# Patient Record
Sex: MALE | Race: OTHER | HISPANIC OR LATINO
[De-identification: names, ages, dates, MRNs, and addresses within clinical notes are randomized per-mention and may not be internally consistent; named-entity substitution may affect disease eponyms.]

---

## 2018-01-12 ENCOUNTER — APPOINTMENT (OUTPATIENT)
Dept: VASCULAR SURGERY | Facility: CLINIC | Age: 58
End: 2018-01-12

## 2018-03-28 ENCOUNTER — APPOINTMENT (OUTPATIENT)
Dept: VASCULAR SURGERY | Facility: CLINIC | Age: 58
End: 2018-03-28
Payer: MEDICARE

## 2018-03-28 PROCEDURE — 93926 LOWER EXTREMITY STUDY: CPT | Mod: 59

## 2018-03-28 PROCEDURE — 93990 DOPPLER FLOW TESTING: CPT

## 2018-03-28 PROCEDURE — 99024 POSTOP FOLLOW-UP VISIT: CPT

## 2018-11-07 ENCOUNTER — APPOINTMENT (OUTPATIENT)
Dept: VASCULAR SURGERY | Facility: CLINIC | Age: 58
End: 2018-11-07
Payer: MEDICARE

## 2018-11-07 PROCEDURE — 99213 OFFICE O/P EST LOW 20 MIN: CPT

## 2018-11-07 PROCEDURE — 93931 UPPER EXTREMITY STUDY: CPT

## 2018-11-07 PROCEDURE — 93971 EXTREMITY STUDY: CPT

## 2018-11-14 ENCOUNTER — APPOINTMENT (OUTPATIENT)
Dept: CARDIOLOGY | Facility: CLINIC | Age: 58
End: 2018-11-14

## 2018-11-14 VITALS
WEIGHT: 156 LBS | BODY MASS INDEX: 23.64 KG/M2 | DIASTOLIC BLOOD PRESSURE: 74 MMHG | RESPIRATION RATE: 12 BRPM | OXYGEN SATURATION: 100 % | HEART RATE: 72 BPM | SYSTOLIC BLOOD PRESSURE: 142 MMHG | HEIGHT: 68 IN | TEMPERATURE: 98.5 F

## 2018-11-14 DIAGNOSIS — M79.605 PAIN IN LEFT LEG: ICD-10-CM

## 2018-11-14 DIAGNOSIS — Z98.890 OTHER SPECIFIED POSTPROCEDURAL STATES: ICD-10-CM

## 2018-11-14 DIAGNOSIS — Z86.39 PERSONAL HISTORY OF OTHER ENDOCRINE, NUTRITIONAL AND METABOLIC DISEASE: ICD-10-CM

## 2018-11-14 DIAGNOSIS — I51.3 INTRACARDIAC THROMBOSIS, NOT ELSEWHERE CLASSIFIED: ICD-10-CM

## 2018-11-14 DIAGNOSIS — I99.8 OTHER DISORDER OF CIRCULATORY SYSTEM: ICD-10-CM

## 2018-11-14 NOTE — ASSESSMENT
[FreeTextEntry1] : 59 yo male with CAD, prior inferior wall STEMI s/p TNK 2012, and anterior wall STEMI s/p PCI w/ Resolute stent (IRAJ) to LAD in 5/2014 at [facility], chronic systolic heart failure, HTN, DM, hyperlipidemia, PAD, nd CKD, who is currently pending right brachiocephalic AV fistula surgery on 11/20/18 with Dr. Mayank Thornton at [place]. Patient is clinically stable from cardiac standpoint and appears to be euvolemic at this time.\par \par Patient does not have any cardiac contraindications to his pending right AV fistula surgery and may proceed with acceptable cardiac risk and without further cardiac work-up. Patient is medically optimized from cardiac standpoint.\par Patient was instructed to hold his coumadin 5 days prior to his surgery date as per vascular surgery.\par Patient to resume coumadin post-operatively when safe from bleeding risk standpoint as per surgery.\par \par Will continue current medical management of patient's chronic systolic HF and CAD.\par \par Patient's blood pressure is well controlled currently. Will continue to monitor on current medical management.

## 2018-11-14 NOTE — HISTORY OF PRESENT ILLNESS
[Preoperative Visit] : for a medical evaluation prior to surgery [Scheduled Procedure ___] : a [unfilled] [Date of Surgery ___] : on [unfilled] [Surgeon Name ___] : surgeon: [unfilled] [Stable] : Stable [Cardiovascular Disease] : cardiovascular disease [Renal Disease] : renal disease [Thromboembolic Problems] : thromboembolic problems [Fever] : no fever [Fatigue] : no fatigue [Chest Pain] : no chest pain [Dyspnea] : no dyspnea [Anti-Platelet Agents] : anti-platelet agents [Frequent Aspirin Use] : frequent aspirin use [Prior Anesthesia] : Prior anesthesia [Prev Anesthesia Reaction] : no previous anesthesia reaction [FreeTextEntry1] : \par 59 yo male with CAD, prior inferior wall STEMI s/p TNK 2012, and anterior wall STEMI s/p PCI w/ Resolute stent (IRAJ) to LAD in 5/2014 at [facility], chronic systolic heart failure, HTN, DM, hyperlipidemia, and CKD, who is currently pending right brachiocephalic AV fistula surgery on 11/20/18 with Dr. Mayank Thornton at [place]. Patient was recently hospitalized from 10/29 - 10/31 for acute on chronic systolic heart failure. Patient was diuresed during hospitalization. Patient is currently doing well from cardiac standpoint. Patient denies exertional chest pain and dyspnea. Patient denies palpitations, syncope, edema, melena, hematochezia, or hematemesis.\par \par PMD: Deepika Cabrera MD\par Nephrology: Hamlet Wagner MD

## 2018-11-14 NOTE — DISCUSSION/SUMMARY
[Optimized for Surgery] : the patient is optimized for surgery [Continue] : Continue medications as currently directed

## 2018-11-14 NOTE — PHYSICAL EXAM
[General Appearance - Well Developed] : well developed [General Appearance - Well Nourished] : well nourished [General Appearance - In No Acute Distress] : no acute distress [Normal Conjunctiva] : the conjunctiva exhibited no abnormalities [No Oral Cyanosis] : no oral cyanosis [] : no respiratory distress [Respiration, Rhythm And Depth] : normal respiratory rhythm and effort [Auscultation Breath Sounds / Voice Sounds] : lungs were clear to auscultation bilaterally [Normal Rate] : normal [Rhythm Regular] : regular [Normal S1] : normal S1 [Normal S2] : normal S2 [No Murmur] : no murmurs heard [No Pitting Edema] : no pitting edema present [Bowel Sounds] : normal bowel sounds [Abdomen Soft] : soft [Abdomen Tenderness] : non-tender [Abnormal Walk] : normal gait [Nail Clubbing] : no clubbing of the fingernails [Cyanosis, Localized] : no localized cyanosis [Oriented To Time, Place, And Person] : oriented to person, place, and time [Affect] : the affect was normal [Mood] : the mood was normal [FreeTextEntry1] : No JVD present [S3] : no S3 [S4] : no S4 [Right Carotid Bruit] : no bruit heard over the right carotid [Left Carotid Bruit] : no bruit heard over the left carotid

## 2018-11-20 ENCOUNTER — APPOINTMENT (OUTPATIENT)
Dept: VASCULAR SURGERY | Facility: HOSPITAL | Age: 58
End: 2018-11-20

## 2018-12-03 ENCOUNTER — RX RENEWAL (OUTPATIENT)
Age: 58
End: 2018-12-03

## 2018-12-14 ENCOUNTER — RECORD ABSTRACTING (OUTPATIENT)
Age: 58
End: 2018-12-14

## 2018-12-14 DIAGNOSIS — Z86.2 PERSONAL HISTORY OF DISEASES OF THE BLOOD AND BLOOD-FORMING ORGANS AND CERTAIN DISORDERS INVOLVING THE IMMUNE MECHANISM: ICD-10-CM

## 2018-12-14 DIAGNOSIS — Z87.2 PERSONAL HISTORY OF DISEASES OF THE SKIN AND SUBCUTANEOUS TISSUE: ICD-10-CM

## 2018-12-14 DIAGNOSIS — Z83.3 FAMILY HISTORY OF DIABETES MELLITUS: ICD-10-CM

## 2018-12-14 DIAGNOSIS — Z82.3 FAMILY HISTORY OF STROKE: ICD-10-CM

## 2018-12-14 DIAGNOSIS — Z86.69 PERSONAL HISTORY OF OTHER DISEASES OF THE NERVOUS SYSTEM AND SENSE ORGANS: ICD-10-CM

## 2018-12-19 ENCOUNTER — APPOINTMENT (OUTPATIENT)
Dept: VASCULAR SURGERY | Facility: CLINIC | Age: 58
End: 2018-12-19

## 2018-12-21 ENCOUNTER — APPOINTMENT (OUTPATIENT)
Dept: INTERNAL MEDICINE | Facility: CLINIC | Age: 58
End: 2018-12-21

## 2019-01-02 ENCOUNTER — APPOINTMENT (OUTPATIENT)
Dept: VASCULAR SURGERY | Facility: CLINIC | Age: 59
End: 2019-01-02
Payer: MEDICARE

## 2019-01-02 VITALS — DIASTOLIC BLOOD PRESSURE: 95 MMHG | SYSTOLIC BLOOD PRESSURE: 159 MMHG | HEART RATE: 85 BPM

## 2019-01-02 PROCEDURE — 99024 POSTOP FOLLOW-UP VISIT: CPT

## 2019-01-03 ENCOUNTER — APPOINTMENT (OUTPATIENT)
Dept: NEPHROLOGY | Facility: CLINIC | Age: 59
End: 2019-01-03

## 2019-01-03 ENCOUNTER — APPOINTMENT (OUTPATIENT)
Dept: INTERNAL MEDICINE | Facility: CLINIC | Age: 59
End: 2019-01-03
Payer: MEDICARE

## 2019-01-03 VITALS
HEIGHT: 68 IN | WEIGHT: 156 LBS | SYSTOLIC BLOOD PRESSURE: 152 MMHG | TEMPERATURE: 97.1 F | DIASTOLIC BLOOD PRESSURE: 80 MMHG | BODY MASS INDEX: 23.64 KG/M2 | HEART RATE: 76 BPM

## 2019-01-03 DIAGNOSIS — Z86.79 PERSONAL HISTORY OF OTHER DISEASES OF THE CIRCULATORY SYSTEM: ICD-10-CM

## 2019-01-03 LAB — INR PPP: 1.2 RATIO

## 2019-01-03 PROCEDURE — 85610 PROTHROMBIN TIME: CPT | Mod: QW

## 2019-01-03 PROCEDURE — 99214 OFFICE O/P EST MOD 30 MIN: CPT | Mod: 25

## 2019-01-03 RX ORDER — LISINOPRIL 20 MG/1
20 TABLET ORAL
Refills: 0 | Status: DISCONTINUED | COMMUNITY
End: 2019-01-03

## 2019-01-03 RX ORDER — HYDROMORPHONE HYDROCHLORIDE 2 MG/1
2 TABLET ORAL
Refills: 0 | Status: DISCONTINUED | COMMUNITY
End: 2019-01-03

## 2019-01-03 RX ORDER — WARFARIN SODIUM 6 MG/1
TABLET ORAL
Refills: 0 | Status: DISCONTINUED | COMMUNITY
End: 2019-01-03

## 2019-01-03 NOTE — HISTORY OF PRESENT ILLNESS
[Post-hospitalization from ___ Hospital] : Post-hospitalization from [unfilled] Hospital [Admitted on: ___] : The patient was admitted on [unfilled] [Discharged on ___] : discharged on [unfilled] [Discharge Summary] : discharge summary [Discharge Med List] : discharge medication list [FreeTextEntry2] : Mr. Lopez  comes in today  after being discharged from [facility] on December 20. He was admitted for dyspnea getting worse and was diagnosed with heart failure. He has end-stage renal disease and had a fistula created in November.He has been evaluated by nephrology  and cardiology while in the hospital. \par today he feels better

## 2019-01-03 NOTE — PHYSICAL EXAM
[No Acute Distress] : no acute distress [Normal Sclera/Conjunctiva] : normal sclera/conjunctiva [Normal Outer Ear/Nose] : the outer ears and nose were normal in appearance [Normal Oropharynx] : the oropharynx was normal [No JVD] : no jugular venous distention [Supple] : supple [No Respiratory Distress] : no respiratory distress  [Clear to Auscultation] : lungs were clear to auscultation bilaterally [Regular Rhythm] : with a regular rhythm [Normal S1, S2] : normal S1 and S2 [Pedal Pulses Present] : the pedal pulses are present [No Edema] : there was no peripheral edema [Soft] : abdomen soft [Non Tender] : non-tender [No Rash] : no rash [Normal Gait] : normal gait [No Focal Deficits] : no focal deficits [de-identified] : faint pedal pulses, R antecubital AVF c  bruit [de-identified] : L transmetatarsal amputation

## 2019-01-03 NOTE — HEALTH RISK ASSESSMENT
[No falls in past year] : Patient reported no falls in the past year [0] : 2) Feeling down, depressed, or hopeless: Not at all (0) [] : No [[id number]] : 0

## 2019-01-03 NOTE — REVIEW OF SYSTEMS
[Negative] : Neurological [Dyspnea on Exertion] : not dyspnea on exertion [FreeTextEntry6] : resolved [FreeTextEntry8] : still urinating [FreeTextEntry9] : L [de-identified] : R antecubital AV F

## 2019-02-04 ENCOUNTER — APPOINTMENT (OUTPATIENT)
Dept: INTERNAL MEDICINE | Facility: CLINIC | Age: 59
End: 2019-02-04
Payer: MEDICARE

## 2019-02-04 VITALS
HEART RATE: 76 BPM | SYSTOLIC BLOOD PRESSURE: 152 MMHG | HEIGHT: 68 IN | WEIGHT: 163 LBS | DIASTOLIC BLOOD PRESSURE: 80 MMHG | BODY MASS INDEX: 24.71 KG/M2 | TEMPERATURE: 97.3 F | OXYGEN SATURATION: 95 %

## 2019-02-04 VITALS — SYSTOLIC BLOOD PRESSURE: 146 MMHG | DIASTOLIC BLOOD PRESSURE: 80 MMHG

## 2019-02-04 PROCEDURE — 85610 PROTHROMBIN TIME: CPT | Mod: QW

## 2019-02-04 PROCEDURE — 99214 OFFICE O/P EST MOD 30 MIN: CPT | Mod: 25

## 2019-02-04 NOTE — PHYSICAL EXAM
[No Acute Distress] : no acute distress [Normal Sclera/Conjunctiva] : normal sclera/conjunctiva [Normal Outer Ear/Nose] : the outer ears and nose were normal in appearance [Normal Oropharynx] : the oropharynx was normal [No JVD] : no jugular venous distention [Supple] : supple [No Respiratory Distress] : no respiratory distress  [Clear to Auscultation] : lungs were clear to auscultation bilaterally [Regular Rhythm] : with a regular rhythm [Normal S1, S2] : normal S1 and S2 [Pedal Pulses Present] : the pedal pulses are present [No Edema] : there was no peripheral edema [Soft] : abdomen soft [Non Tender] : non-tender [Normal Gait] : normal gait [No Focal Deficits] : no focal deficits [de-identified] : faint pedal pulses, R antecubital AVF c  bruit [de-identified] : L transmetatarsal amputation

## 2019-02-04 NOTE — HEALTH RISK ASSESSMENT
[] : No [No falls in past year] : Patient reported no falls in the past year [0] : 2) Feeling down, depressed, or hopeless: Not at all (0) [de-identified] : Sharmaine Thornton Butman [[id number]] : 0

## 2019-02-04 NOTE — HISTORY OF PRESENT ILLNESS
[de-identified] : here for medical f/u , doing well, except 2 episodes of nose bleeds last week, stopped coumadin 1/29 and 1/30 and restarted after that  \par denies SOB and is compliant to insulin, still urinating and not on HD

## 2019-02-04 NOTE — REVIEW OF SYSTEMS
[Dyspnea on Exertion] : not dyspnea on exertion [Negative] : Neurological [FreeTextEntry6] : resolved [FreeTextEntry8] : still urinating [de-identified] : R antecubital AV F

## 2019-02-05 LAB — INR PPP: 1.4 RATIO

## 2019-02-07 LAB
ALBUMIN SERPL ELPH-MCNC: 3 G/DL
ALP BLD-CCNC: 208 U/L
ALT SERPL-CCNC: 33 U/L
ANION GAP SERPL CALC-SCNC: 10 MMOL/L
AST SERPL-CCNC: 38 U/L
BASOPHILS # BLD AUTO: 0.05 K/UL
BASOPHILS NFR BLD AUTO: 1.5 %
BILIRUB SERPL-MCNC: 0.5 MG/DL
BUN SERPL-MCNC: 55 MG/DL
CALCIUM SERPL-MCNC: 8.9 MG/DL
CHLORIDE SERPL-SCNC: 106 MMOL/L
CHOLEST SERPL-MCNC: 95 MG/DL
CHOLEST/HDLC SERPL: 2.1 RATIO
CO2 SERPL-SCNC: 25 MMOL/L
CREAT SERPL-MCNC: 3.67 MG/DL
EOSINOPHIL # BLD AUTO: 0.1 K/UL
EOSINOPHIL NFR BLD AUTO: 3 %
GLUCOSE SERPL-MCNC: 83 MG/DL
HBA1C MFR BLD HPLC: 7 %
HCT VFR BLD CALC: 32.4 %
HDLC SERPL-MCNC: 45 MG/DL
HGB BLD-MCNC: 10 G/DL
IMM GRANULOCYTES NFR BLD AUTO: 0 %
LDLC SERPL CALC-MCNC: 41 MG/DL
LYMPHOCYTES # BLD AUTO: 0.5 K/UL
LYMPHOCYTES NFR BLD AUTO: 15.2 %
MAN DIFF?: NORMAL
MCHC RBC-ENTMCNC: 26.5 PG
MCHC RBC-ENTMCNC: 30.9 GM/DL
MCV RBC AUTO: 85.7 FL
MONOCYTES # BLD AUTO: 0.55 K/UL
MONOCYTES NFR BLD AUTO: 16.7 %
NEUTROPHILS # BLD AUTO: 2.1 K/UL
NEUTROPHILS NFR BLD AUTO: 63.6 %
PLATELET # BLD AUTO: 227 K/UL
POTASSIUM SERPL-SCNC: 4.5 MMOL/L
PROT SERPL-MCNC: 7 G/DL
RBC # BLD: 3.78 M/UL
RBC # FLD: 17.5 %
SODIUM SERPL-SCNC: 141 MMOL/L
TRIGL SERPL-MCNC: 44 MG/DL
WBC # FLD AUTO: 3.3 K/UL

## 2019-02-08 ENCOUNTER — RX RENEWAL (OUTPATIENT)
Age: 59
End: 2019-02-08

## 2019-02-08 ENCOUNTER — APPOINTMENT (OUTPATIENT)
Dept: NEPHROLOGY | Facility: CLINIC | Age: 59
End: 2019-02-08
Payer: MEDICARE

## 2019-02-08 VITALS
DIASTOLIC BLOOD PRESSURE: 70 MMHG | HEART RATE: 72 BPM | HEIGHT: 68 IN | BODY MASS INDEX: 24.93 KG/M2 | SYSTOLIC BLOOD PRESSURE: 108 MMHG | WEIGHT: 164.5 LBS

## 2019-02-08 DIAGNOSIS — D63.1 ANEMIA IN CHRONIC KIDNEY DISEASE: ICD-10-CM

## 2019-02-08 PROCEDURE — 99214 OFFICE O/P EST MOD 30 MIN: CPT

## 2019-02-08 NOTE — PHYSICAL EXAM
[General Appearance - Alert] : alert [General Appearance - In No Acute Distress] : in no acute distress [Sclera] : the sclera and conjunctiva were normal [Extraocular Movements] : extraocular movements were intact [Outer Ear] : the ears and nose were normal in appearance [Hearing Threshold Finger Rub Not [place]] : hearing was normal [Neck Appearance] : the appearance of the neck was normal [Neck Cervical Mass (___cm)] : no neck mass was observed [] : no respiratory distress [Exaggerated Use Of Accessory Muscles For Inspiration] : no accessory muscle use [Apical Impulse] : the apical impulse was normal [Heart Sounds] : normal S1 and S2 [Bowel Sounds] : normal bowel sounds [Abdomen Tenderness] : non-tender [Oriented To Time, Place, And Person] : oriented to person, place, and time [Affect] : the affect was normal [FreeTextEntry1] : venous stasis

## 2019-02-08 NOTE — ASSESSMENT
[FreeTextEntry1] : CHF\par Chronic kidney disease, stage 4 (moderate) - N18.3  \par Anemia associated with chronic renal failure - D63.1   \par  Repeated episodes of CIELO superimposed on CKD - s/p revasuclarization of L leg and BKA with contrast studies - unclear what his kidney function is at this time as he hast been seen since transfer to [facility] and just d/c'd from [facility] new labs, depending on reuslts will f/u in 2-4 weeks. \par \par

## 2019-02-08 NOTE — HISTORY OF PRESENT ILLNESS
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 EF 30%.

## 2019-02-18 ENCOUNTER — RX RENEWAL (OUTPATIENT)
Age: 59
End: 2019-02-18

## 2019-02-18 ENCOUNTER — MEDICATION RENEWAL (OUTPATIENT)
Age: 59
End: 2019-02-18

## 2019-02-19 ENCOUNTER — RX RENEWAL (OUTPATIENT)
Age: 59
End: 2019-02-19

## 2019-02-19 RX ORDER — LANCETS 28 GAUGE
EACH MISCELLANEOUS
Qty: 1 | Refills: 3 | Status: ACTIVE | COMMUNITY
Start: 1900-01-01 | End: 1900-01-01

## 2019-02-19 RX ORDER — BLOOD SUGAR DIAGNOSTIC
STRIP MISCELLANEOUS
Qty: 1 | Refills: 3 | Status: ACTIVE | COMMUNITY
Start: 1900-01-01 | End: 1900-01-01

## 2019-02-20 ENCOUNTER — NON-APPOINTMENT (OUTPATIENT)
Age: 59
End: 2019-02-20

## 2019-02-20 ENCOUNTER — APPOINTMENT (OUTPATIENT)
Dept: CARDIOLOGY | Facility: CLINIC | Age: 59
End: 2019-02-20
Payer: MEDICARE

## 2019-02-20 ENCOUNTER — APPOINTMENT (OUTPATIENT)
Dept: PAIN MANAGEMENT | Facility: CLINIC | Age: 59
End: 2019-02-20
Payer: MEDICARE

## 2019-02-20 ENCOUNTER — MEDICATION RENEWAL (OUTPATIENT)
Age: 59
End: 2019-02-20

## 2019-02-20 VITALS
OXYGEN SATURATION: 100 % | RESPIRATION RATE: 12 BRPM | SYSTOLIC BLOOD PRESSURE: 130 MMHG | WEIGHT: 170 LBS | HEART RATE: 75 BPM | BODY MASS INDEX: 25.76 KG/M2 | HEIGHT: 68 IN | DIASTOLIC BLOOD PRESSURE: 65 MMHG

## 2019-02-20 VITALS
HEIGHT: 68 IN | BODY MASS INDEX: 25.76 KG/M2 | DIASTOLIC BLOOD PRESSURE: 60 MMHG | SYSTOLIC BLOOD PRESSURE: 128 MMHG | WEIGHT: 170 LBS

## 2019-02-20 DIAGNOSIS — Z87.891 PERSONAL HISTORY OF NICOTINE DEPENDENCE: ICD-10-CM

## 2019-02-20 DIAGNOSIS — M54.16 RADICULOPATHY, LUMBAR REGION: ICD-10-CM

## 2019-02-20 DIAGNOSIS — Z86.39 PERSONAL HISTORY OF OTHER ENDOCRINE, NUTRITIONAL AND METABOLIC DISEASE: ICD-10-CM

## 2019-02-20 PROCEDURE — 99204 OFFICE O/P NEW MOD 45 MIN: CPT

## 2019-02-20 PROCEDURE — 93000 ELECTROCARDIOGRAM COMPLETE: CPT

## 2019-02-20 PROCEDURE — 99213 OFFICE O/P EST LOW 20 MIN: CPT

## 2019-02-20 RX ORDER — BLOOD-GLUCOSE METER
KIT MISCELLANEOUS
Refills: 0 | Status: DISCONTINUED | COMMUNITY
End: 2019-02-20

## 2019-02-20 RX ORDER — BLOOD SUGAR DIAGNOSTIC
32G X 6 MM STRIP MISCELLANEOUS
Qty: 100 | Refills: 11 | Status: ACTIVE | COMMUNITY
Start: 1900-01-01 | End: 1900-01-01

## 2019-02-20 NOTE — PHYSICAL EXAM
[de-identified] : Constitutional: Well-developed, in no acute distress\par Eyes: Pupil- Right: normal, Left: normal\par Neck exam: Inspection normal\par Respiratory: Normal effort, speaking in full sentences\par Cardiovascular: Regular rate and rhythm\par Vascular: Dorsal pedis pulses normal and equal bilaterally\par Abdomen: Inspection normal, no distension\par Skin: Inspection normal, no bruising noted\par Musculoskeletal:\par Lumbar Spine:   Gait: Antalgic\par 		Inspection: Normal curvature, no abnormal kyphosis or scoliosis\par 		Facet loading: pain bilaterally\par 		Palpation:\par 			Lumbar and paraspinal muscles: pain bilaterally\par 			Sacroiliac joint: no pain\par 			Greater trochanter: no pain\par 	\par \par Extremity: scars noted on bilateral lower extremities, left toe amputations noted\par Neurological: Memory normal, AAO x 3, Cranial nerves II - XII grossly normal\par Psychiatric: Appropriate mood and affect, oriented to time, place, person, and situation\par \par

## 2019-02-20 NOTE — HISTORY OF PRESENT ILLNESS
[___ yrs] : [unfilled] year(s) ago [10] : a maximum pain level of 10/10 [Aching] : aching [Medications] : medications [FreeTextEntry1] : 58 yom w/ PMH of DM (on insulin), CAD s/p stenting 2012, CKD, bilateral PVD (w/ recurrent hospitalization for CHF/COPD ( 10/2018, 12/2018 and 1/2019),and is s/p revascularization 2016, and hospitalized with gangrenous toe/foot - renal failure deteriorated and he was transferred to [facility] where his L leg was revascularized and s/p BKA of last two toes L foot presents w/ significant bilateral leg pain and back pain. Left leg pain is greater then right leg pain. Reports that he uses percocet for pain relief but that he wants to discontinue this. He is on warfarin and has significant cardiac issues, EF 30%. Pain is 10/10 in intensity. Pain is sharp and burning. Pain is worse w/ movement. Pain improves w/ rest. No relief w/ physical therapy or other conservative measures. No other recent changes in health. [FreeTextEntry7] : lower back pain [FreeTextEntry3] : cold weather

## 2019-02-20 NOTE — ASSESSMENT
[FreeTextEntry1] : 58 yom w/ PMH of DM (on insulin), CAD s/p stenting 2012, CKD, bilateral PVD (w/ recurrent hospitalization for CHF/COPD ( 10/2018, 12/2018 and 1/2019),and is s/p revascularization 2016, and hospitalized with gangrenous toe/foot - renal failure deteriorated and he was transferred to [facility] where his L leg was revascularized and s/p BKA of last two toes L foot presents w/ significant bilateral leg pain and back pain. No relief w/ physical therapy or other conservative measures. I do believe that he has postsurgical and diabetic peripheral pain, as well as lumbar radiculopathy. Recommend MRI of the lumbar spine to assess. Would consider spinal intervention, however, patient would need to discontinue warfarin 5 days prior to any intervention. Will return to review imaging and plan for potential intervention. I agree with the patient and Dr. Cabrera in discontinuing opioid medications.

## 2019-02-20 NOTE — CONSULT LETTER
[Dear  ___] : Dear  [unfilled], [Consult Letter:] : I had the pleasure of evaluating your patient, [unfilled]. [Please see my note below.] : Please see my note below. [Consult Closing:] : Thank you very much for allowing me to participate in the care of this patient.  If you have any questions, please do not hesitate to contact me. [Sincerely,] : Sincerely, [FreeTextEntry3] : Caden Mendes

## 2019-02-20 NOTE — REASON FOR VISIT
[Follow-Up - Clinic] : a clinic follow-up of [Coronary Artery Disease] : coronary artery disease [Heart Failure] : congestive heart failure [Hypertension] : hypertension

## 2019-02-20 NOTE — REVIEW OF SYSTEMS
[Feeling Tired] : fatigue [Decrease Hearing] : decrease hearing [Cough] : cough [Abdominal Pain] : abdominal pain [Joint Pain] : joint pain [Numbness] : numbness [Headache] : headache [Dizziness] : dizziness [Anxiety] : anxiety [Depression] : depression [Negative] : Heme/Lymph [FreeTextEntry3] : vision change [de-identified] : seizures/memory loss

## 2019-02-20 NOTE — DISCUSSION/SUMMARY
[Optimized for Surgery] : the patient is optimized for surgery [Continue] : Continue medications as currently directed [___ Month(s)] : [unfilled] month(s)

## 2019-02-21 ENCOUNTER — MEDICATION RENEWAL (OUTPATIENT)
Age: 59
End: 2019-02-21

## 2019-02-21 NOTE — HISTORY OF PRESENT ILLNESS
[Preoperative Visit] : for a medical evaluation prior to surgery [Scheduled Procedure ___] : a [unfilled] [Date of Surgery ___] : on [unfilled] [Surgeon Name ___] : surgeon: [unfilled] [Stable] : Stable [Fever] : no fever [Fatigue] : no fatigue [Chest Pain] : no chest pain [Dyspnea] : no dyspnea [Cardiovascular Disease] : cardiovascular disease [Anti-Platelet Agents] : anti-platelet agents [Renal Disease] : renal disease [Thromboembolic Problems] : thromboembolic problems [Frequent Aspirin Use] : frequent aspirin use [Prior Anesthesia] : Prior anesthesia [Prev Anesthesia Reaction] : no previous anesthesia reaction [FreeTextEntry1] : 57 yo male with CAD, prior inferior wall STEMI s/p TNK 2012, and anterior wall STEMI s/p PCI w/ Resolute stent (IRAJ) to LAD in 5/2014 at [facility], chronic systolic heart failure (LVEF 25-30%), HTN, DM, hyperlipidemia, and CKD. Patient was recently hospitalized at [place] from 1/7 - 1/10/19 for acute on chronic systolic heart failure. Patient presents today for cardiac follow-up. Patient denies chest pain, dyspnea, palpitations, syncope, melena, hematochezia, or hematemesis. Patient reports that he is currently pending dental extraction at [facility] this Friday (2/22) and has stopped his coumadin today in preparation.\par \par PMD: Deepika Cabrera MD\par Nephrology: Hamlet Wagner MD

## 2019-02-21 NOTE — ASSESSMENT
[FreeTextEntry1] : 57 yo male with CAD, prior inferior wall STEMI s/p TNK 2012, and anterior wall STEMI s/p PCI w/ Resolute stent (IRAJ) to LAD in 5/2014 at [facility], chronic systolic heart failure (LVEF 25-30%), HTN, DM, hyperlipidemia, PAD, and CKD. Patient is clinically stable from cardiac standpoint and does not appear to be euvolemic at this time. \par \par Patient does not have any cardiac contraindications to his pending dental extraction this Friday (2/22). \par Patient was instructed to continue to hold his coumadin and resume it post-dental extraction. \par \par Will continue current medical management of patient's chronic systolic HF and CAD as he is clinically stable and euvolemic.\par \par Blood pressure is well controlled currently. Will continue to monitor on current medical management.\par \par RTC in 3-4 months

## 2019-02-21 NOTE — PHYSICAL EXAM
[General Appearance - Well Developed] : well developed [General Appearance - Well Nourished] : well nourished [General Appearance - In No Acute Distress] : no acute distress [Normal Conjunctiva] : the conjunctiva exhibited no abnormalities [No Oral Cyanosis] : no oral cyanosis [] : no respiratory distress [Respiration, Rhythm And Depth] : normal respiratory rhythm and effort [Auscultation Breath Sounds / Voice Sounds] : lungs were clear to auscultation bilaterally [Bowel Sounds] : normal bowel sounds [Oriented To Time, Place, And Person] : oriented to person, place, and time [Affect] : the affect was normal [Mood] : the mood was normal [Normal Rate] : normal [Rhythm Regular] : regular [Normal S1] : normal S1 [Normal S2] : normal S2 [No Murmur] : no murmurs heard [___ +] : bilateral [unfilled]U+ pretibial pitting edema [FreeTextEntry1] : No JVD present [S3] : no S3 [S4] : no S4 [Right Carotid Bruit] : no bruit heard over the right carotid [Left Carotid Bruit] : no bruit heard over the left carotid

## 2019-02-25 RX ORDER — BLOOD-GLUCOSE METER
W/DEVICE KIT MISCELLANEOUS
Qty: 1 | Refills: 0 | Status: ACTIVE | COMMUNITY

## 2019-03-08 ENCOUNTER — APPOINTMENT (OUTPATIENT)
Dept: NEPHROLOGY | Facility: CLINIC | Age: 59
End: 2019-03-08

## 2019-03-29 ENCOUNTER — RESULT REVIEW (OUTPATIENT)
Age: 59
End: 2019-03-29

## 2019-04-03 ENCOUNTER — MEDICATION RENEWAL (OUTPATIENT)
Age: 59
End: 2019-04-03

## 2019-04-03 RX ORDER — FOLIC ACID/VIT B COMPLEX AND C 0.8 MG
0.8 TABLET ORAL
Qty: 90 | Refills: 0 | Status: ACTIVE | COMMUNITY
Start: 2019-02-08 | End: 1900-01-01

## 2019-04-09 ENCOUNTER — APPOINTMENT (OUTPATIENT)
Dept: INTERNAL MEDICINE | Facility: CLINIC | Age: 59
End: 2019-04-09
Payer: MEDICARE

## 2019-04-09 VITALS
HEART RATE: 76 BPM | WEIGHT: 187 LBS | BODY MASS INDEX: 28.34 KG/M2 | HEIGHT: 68 IN | TEMPERATURE: 99.2 F | SYSTOLIC BLOOD PRESSURE: 140 MMHG | DIASTOLIC BLOOD PRESSURE: 80 MMHG

## 2019-04-09 DIAGNOSIS — F17.200 NICOTINE DEPENDENCE, UNSPECIFIED, UNCOMPLICATED: ICD-10-CM

## 2019-04-09 DIAGNOSIS — N18.9 CHRONIC KIDNEY DISEASE, UNSPECIFIED: ICD-10-CM

## 2019-04-09 DIAGNOSIS — F19.90 OTHER PSYCHOACTIVE SUBSTANCE USE, UNSPECIFIED, UNCOMPLICATED: ICD-10-CM

## 2019-04-09 PROCEDURE — 99214 OFFICE O/P EST MOD 30 MIN: CPT

## 2019-04-09 PROCEDURE — G0444 DEPRESSION SCREEN ANNUAL: CPT | Mod: 59

## 2019-04-09 NOTE — HEALTH RISK ASSESSMENT
[] : No [No falls in past year] : Patient reported no falls in the past year [0] : 2) Feeling down, depressed, or hopeless: Not at all (0) [[id number]] : 0

## 2019-04-09 NOTE — HISTORY OF PRESENT ILLNESS
[de-identified] : here for medical f/u  , was recently d/c from hospital, doing better, resumed all his  meds and new changes noted\par  R arm swollen more in the last week \par  R AVF  has been used

## 2019-04-09 NOTE — REVIEW OF SYSTEMS
[Dyspnea on Exertion] : not dyspnea on exertion [Negative] : Neurological [FreeTextEntry6] : resolved [de-identified] : R antecubital AV F ++ swollen R arm

## 2019-04-09 NOTE — PHYSICAL EXAM
[No Acute Distress] : no acute distress [Normal Sclera/Conjunctiva] : normal sclera/conjunctiva [Normal Oropharynx] : the oropharynx was normal [Normal Outer Ear/Nose] : the outer ears and nose were normal in appearance [Supple] : supple [No JVD] : no jugular venous distention [No Respiratory Distress] : no respiratory distress  [Clear to Auscultation] : lungs were clear to auscultation bilaterally [Regular Rhythm] : with a regular rhythm [Pedal Pulses Present] : the pedal pulses are present [Normal S1, S2] : normal S1 and S2 [No Edema] : there was no peripheral edema [Soft] : abdomen soft [Normal Gait] : normal gait [Non Tender] : non-tender [No Focal Deficits] : no focal deficits [de-identified] : , R antecubital AVF c  bruit, ++ R UE swelling [de-identified] : L transmetatarsal amputation

## 2019-04-10 ENCOUNTER — APPOINTMENT (OUTPATIENT)
Dept: VASCULAR SURGERY | Facility: CLINIC | Age: 59
End: 2019-04-10
Payer: MEDICARE

## 2019-04-10 PROCEDURE — 93990 DOPPLER FLOW TESTING: CPT

## 2019-04-10 PROCEDURE — 99213 OFFICE O/P EST LOW 20 MIN: CPT

## 2019-04-10 NOTE — REVIEW OF SYSTEMS
Right Abnormal/Left Normal [Feeling Tired] : not feeling tired [Fever] : no fever [Chills] : no chills [Limb Pain] : no limb pain [Shortness Of Breath] : no shortness of breath [Limb Swelling] : limb swelling

## 2019-04-10 NOTE — PHYSICAL EXAM
[JVD] : jugular venous distention ~L [Normal Breath Sounds] : Normal breath sounds [Skin Ulcer] : no ulcer [2+] : right 2+ [Oriented to Person] : oriented to person [Alert] : alert [Oriented to Place] : oriented to place [Oriented to Time] : oriented to time [de-identified] : Awake and Alert [de-identified] : right arm with edema [de-identified] : appropriate

## 2019-04-10 NOTE — ASSESSMENT
[FreeTextEntry1] : 58-year-old male referring started on hemodialysis. The patient was seen approximately 2 weeks ago with swelling of the right upper extremity. He is status post a fistulogram which demonstrated no evidence of central venous stenosis. His edema seems to be slowly improving. I recommended he continue to elevate his arm. He will followup in 2 weeks. It's edema does not resolve he will undergo a repeat fistulogram.\par \par Followup in 2 weeks

## 2019-04-10 NOTE — HISTORY OF PRESENT ILLNESS
[FreeTextEntry1] : 57 yo male well known to me. He is s/p a fistulagram and balloon angioplasty of the AVF for difficulty accessing the fistula when HD was initiated. He reports that he is undergoing uneventful HD. He reports that his arm swelling is slowly improving. He denies pain in his right arm.

## 2019-06-19 ENCOUNTER — RX RENEWAL (OUTPATIENT)
Age: 59
End: 2019-06-19

## 2019-06-19 ENCOUNTER — MEDICATION RENEWAL (OUTPATIENT)
Age: 59
End: 2019-06-19

## 2019-08-07 ENCOUNTER — RESULT REVIEW (OUTPATIENT)
Age: 59
End: 2019-08-07

## 2019-08-07 ENCOUNTER — APPOINTMENT (OUTPATIENT)
Dept: THORACIC SURGERY | Facility: HOSPITAL | Age: 59
End: 2019-08-07
Payer: MEDICARE

## 2019-08-07 PROCEDURE — 32653 THORACOSCOPY REMOV FB/FIBRIN: CPT | Mod: AS

## 2019-08-07 PROCEDURE — 32653 THORACOSCOPY REMOV FB/FIBRIN: CPT

## 2019-08-09 ENCOUNTER — RESULT REVIEW (OUTPATIENT)
Age: 59
End: 2019-08-09

## 2019-08-10 ENCOUNTER — RESULT REVIEW (OUTPATIENT)
Age: 59
End: 2019-08-10

## 2019-08-26 ENCOUNTER — APPOINTMENT (OUTPATIENT)
Dept: THORACIC SURGERY | Facility: CLINIC | Age: 59
End: 2019-08-26

## 2019-08-28 ENCOUNTER — RESULT REVIEW (OUTPATIENT)
Age: 59
End: 2019-08-28

## 2019-09-04 ENCOUNTER — RX RENEWAL (OUTPATIENT)
Age: 59
End: 2019-09-04

## 2019-09-05 ENCOUNTER — APPOINTMENT (OUTPATIENT)
Dept: INTERNAL MEDICINE | Facility: CLINIC | Age: 59
End: 2019-09-05

## 2019-09-09 ENCOUNTER — APPOINTMENT (OUTPATIENT)
Dept: THORACIC SURGERY | Facility: CLINIC | Age: 59
End: 2019-09-09

## 2019-11-07 ENCOUNTER — APPOINTMENT (OUTPATIENT)
Dept: INTERNAL MEDICINE | Facility: CLINIC | Age: 59
End: 2019-11-07

## 2019-11-19 ENCOUNTER — APPOINTMENT (OUTPATIENT)
Dept: INTERNAL MEDICINE | Facility: CLINIC | Age: 59
End: 2019-11-19

## 2019-11-21 ENCOUNTER — RX RENEWAL (OUTPATIENT)
Age: 59
End: 2019-11-21

## 2019-11-21 RX ORDER — BLOOD-GLUCOSE METER
31G X 6 MM EACH MISCELLANEOUS
Qty: 1 | Refills: 10 | Status: ACTIVE | COMMUNITY
Start: 2019-11-21 | End: 1900-01-01

## 2019-11-21 RX ORDER — BLOOD SUGAR DIAGNOSTIC
STRIP MISCELLANEOUS
Qty: 1 | Refills: 10 | Status: ACTIVE | COMMUNITY
Start: 2019-11-21 | End: 1900-01-01

## 2019-11-21 RX ORDER — BLOOD-GLUCOSE METER
EACH MISCELLANEOUS
Qty: 100 | Refills: 10 | Status: ACTIVE | COMMUNITY
Start: 2019-11-21 | End: 1900-01-01

## 2019-12-12 ENCOUNTER — APPOINTMENT (OUTPATIENT)
Dept: INTERNAL MEDICINE | Facility: CLINIC | Age: 59
End: 2019-12-12
Payer: MEDICARE

## 2019-12-12 VITALS
HEIGHT: 67 IN | WEIGHT: 163.14 LBS | TEMPERATURE: 97.6 F | SYSTOLIC BLOOD PRESSURE: 120 MMHG | BODY MASS INDEX: 25.61 KG/M2 | RESPIRATION RATE: 16 BRPM | DIASTOLIC BLOOD PRESSURE: 60 MMHG

## 2019-12-12 PROCEDURE — 99214 OFFICE O/P EST MOD 30 MIN: CPT

## 2019-12-12 RX ORDER — WARFARIN 5 MG/1
5 TABLET ORAL DAILY
Qty: 90 | Refills: 0 | Status: DISCONTINUED | COMMUNITY
End: 2019-12-12

## 2019-12-12 NOTE — HISTORY OF PRESENT ILLNESS
[FreeTextEntry2] : Nathan  comes in today for medical followup after he was admitted on October 19 for abdominal pain. His investigations did not reveal anything acute and  he was discharged 3 days later. On December 3 he had a brief visit in the emergency room at [facility] where he was diagnosed with pneumonia? and was given Zithromax. Today he feels better he continues in his dialysis 3 times a week and had recent blood work.

## 2019-12-12 NOTE — PHYSICAL EXAM
[Normal] : no CVA tenderness, no spinal tenderness [de-identified] : toes amputations ,  R upper arm AV  fistula

## 2019-12-12 NOTE — REVIEW OF SYSTEMS
[Dyspnea on Exertion] : not dyspnea on exertion [Negative] : Neurological [FreeTextEntry6] : resolved [FreeTextEntry9] : transmetatarsal amputation [de-identified] : L foot stump growth nontender [de-identified] : R antecubital AV F

## 2020-01-28 DIAGNOSIS — E83.39 OTHER DISORDERS OF PHOSPHORUS METABOLISM: ICD-10-CM

## 2020-05-29 ENCOUNTER — RX RENEWAL (OUTPATIENT)
Age: 60
End: 2020-05-29

## 2020-06-30 ENCOUNTER — APPOINTMENT (OUTPATIENT)
Dept: INTERNAL MEDICINE | Facility: CLINIC | Age: 60
End: 2020-06-30
Payer: MEDICARE

## 2020-06-30 VITALS
BODY MASS INDEX: 24.8 KG/M2 | WEIGHT: 158 LBS | SYSTOLIC BLOOD PRESSURE: 130 MMHG | HEIGHT: 67 IN | DIASTOLIC BLOOD PRESSURE: 72 MMHG | HEART RATE: 72 BPM | TEMPERATURE: 98.7 F

## 2020-06-30 DIAGNOSIS — Z23 ENCOUNTER FOR IMMUNIZATION: ICD-10-CM

## 2020-06-30 DIAGNOSIS — Z79.01 LONG TERM (CURRENT) USE OF ANTICOAGULANTS: ICD-10-CM

## 2020-06-30 PROCEDURE — 99214 OFFICE O/P EST MOD 30 MIN: CPT | Mod: 25

## 2020-06-30 PROCEDURE — 90732 PPSV23 VACC 2 YRS+ SUBQ/IM: CPT

## 2020-06-30 PROCEDURE — G0009: CPT

## 2020-06-30 RX ORDER — SEVELAMER HYDROCHLORIDE 800 MG/1
800 TABLET, FILM COATED ORAL 3 TIMES DAILY
Qty: 540 | Refills: 0 | Status: DISCONTINUED | COMMUNITY
Start: 2020-01-28 | End: 2020-06-30

## 2020-06-30 RX ORDER — CLOPIDOGREL BISULFATE 75 MG/1
75 TABLET, FILM COATED ORAL DAILY
Qty: 90 | Refills: 10 | Status: DISCONTINUED | COMMUNITY
Start: 2019-11-21 | End: 2020-06-30

## 2020-06-30 RX ORDER — ASCORBIC ACID, FOLIC ACID, NIACIN, THIAMINE, RIBOFLAVIN, PYRIDOXINE, CYANOCOBALAMIN, PANTOTHENIC ACID, BIOTIN 100; 150; 6; 1; 20; 5; 10; 1.7; 1.5 MG/1; UG/1; UG/1; MG/1; MG/1; MG/1; MG/1; MG/1; MG/1
1 CAPSULE, LIQUID FILLED ORAL
Refills: 0 | Status: DISCONTINUED | COMMUNITY
End: 2020-06-30

## 2020-06-30 RX ORDER — HYDRALAZINE HYDROCHLORIDE 25 MG/1
25 TABLET ORAL
Qty: 270 | Refills: 0 | Status: DISCONTINUED | COMMUNITY
Start: 1900-01-01 | End: 2020-06-30

## 2020-06-30 RX ORDER — OXYCODONE AND ACETAMINOPHEN 5; 325 MG/1; MG/1
5-325 TABLET ORAL DAILY
Qty: 5 | Refills: 0 | Status: DISCONTINUED | COMMUNITY
Start: 2018-11-21 | End: 2020-06-30

## 2020-06-30 RX ORDER — SODIUM BICARBONATE 650 MG/1
650 TABLET ORAL 3 TIMES DAILY
Qty: 270 | Refills: 0 | Status: DISCONTINUED | COMMUNITY
Start: 2019-01-03 | End: 2020-06-30

## 2020-06-30 NOTE — REVIEW OF SYSTEMS
[Shortness Of Breath] : no shortness of breath [Cough] : no cough [Back Pain] : back pain [Dyspnea on Exertion] : not dyspnea on exertion [Negative] : Neurological [FreeTextEntry9] : transmetatarsal amputation, R leg pain  [de-identified] : lumps on torso [de-identified] : R antecubital AV F

## 2020-06-30 NOTE — HISTORY OF PRESENT ILLNESS
[de-identified] : here for medical f/u and handicap permit \par feels well , walking c cane, will get Lumbar epidural in Replaced by [facility] next week , now on Tramadol from pain manegement

## 2020-06-30 NOTE — PHYSICAL EXAM
[No Edema] : there was no peripheral edema [Grossly Normal Strength/Tone] : grossly normal strength/tone [Normal] : normal gait, coordination grossly intact, no focal deficits and deep tendon reflexes were 2+ and symmetric [de-identified] : R AVF c bruit and thrill [de-identified] : cane

## 2020-09-02 ENCOUNTER — RX RENEWAL (OUTPATIENT)
Age: 60
End: 2020-09-02

## 2020-10-02 ENCOUNTER — RX RENEWAL (OUTPATIENT)
Age: 60
End: 2020-10-02

## 2020-10-15 ENCOUNTER — NON-APPOINTMENT (OUTPATIENT)
Age: 60
End: 2020-10-15

## 2020-10-15 ENCOUNTER — APPOINTMENT (OUTPATIENT)
Dept: CARDIOLOGY | Facility: CLINIC | Age: 60
End: 2020-10-15
Payer: MEDICARE

## 2020-10-15 VITALS
TEMPERATURE: 97.9 F | OXYGEN SATURATION: 99 % | WEIGHT: 163.44 LBS | DIASTOLIC BLOOD PRESSURE: 60 MMHG | HEART RATE: 61 BPM | SYSTOLIC BLOOD PRESSURE: 120 MMHG | RESPIRATION RATE: 12 BRPM | BODY MASS INDEX: 25.6 KG/M2

## 2020-10-15 PROCEDURE — 93000 ELECTROCARDIOGRAM COMPLETE: CPT

## 2020-10-15 PROCEDURE — 99214 OFFICE O/P EST MOD 30 MIN: CPT

## 2020-10-16 NOTE — ASSESSMENT
[FreeTextEntry1] : 60 yo male with CAD, prior inferior wall STEMI s/p TNK 2012, and anterior wall STEMI s/p PCI w/ Resolute stent (IRAJ) to LAD in 5/2014 at [facility], chronic systolic heart failure (LVEF 30-35% per 8/2019 echo, HTN, DM, hyperlipidemia, PAD, and ESRD on HD (MWF). Patient is clinically stable from cardiac standpoint and appears to be euvolemic at this time. \par \par Will continue current medical management of patient's chronic systolic heart failure and CAD as he is clinically stable and euvolemic.\par Will repeat echocardiogram to reassess LV function on current medical therapy.\par \par Blood pressure is well controlled currently. Will continue to monitor on current medical management.

## 2020-10-16 NOTE — PHYSICAL EXAM
[General Appearance - Well Developed] : well developed [General Appearance - Well Nourished] : well nourished [General Appearance - In No Acute Distress] : no acute distress [Normal Conjunctiva] : the conjunctiva exhibited no abnormalities [No Oral Cyanosis] : no oral cyanosis [] : no respiratory distress [Respiration, Rhythm And Depth] : normal respiratory rhythm and effort [Auscultation Breath Sounds / Voice Sounds] : lungs were clear to auscultation bilaterally [Bowel Sounds] : normal bowel sounds [Oriented To Time, Place, And Person] : oriented to person, place, and time [Affect] : the affect was normal [Mood] : the mood was normal [Normal Rate] : normal [Rhythm Regular] : regular [Normal S1] : normal S1 [Normal S2] : normal S2 [No Murmur] : no murmurs heard [___ +] : bilateral [unfilled]U+ pretibial pitting edema [FreeTextEntry1] : No JVD present [S3] : no S3 [Right Carotid Bruit] : no bruit heard over the right carotid [S4] : no S4 [Left Carotid Bruit] : no bruit heard over the left carotid

## 2020-10-16 NOTE — HISTORY OF PRESENT ILLNESS
[FreeTextEntry1] : 58 yo male with CAD, prior inferior wall STEMI s/p TNK 2012, and anterior wall STEMI s/p PCI w/ Resolute stent (IRAJ) to LAD in 5/2014 at [facility], chronic systolic heart failure (LVEF 30-35% per 8/2019 echo), HTN, DM, hyperlipidemia, and ESRD on HD (MWF). Patient presents today for cardiac follow-up. Patient denies chest pain, dyspnea, palpitations, syncope, melena, hematochezia, or hematemesis. \par \par PMD: Deepika Cabrera MD\par Nephrology: Hamlet Wagner MD

## 2020-11-24 ENCOUNTER — RX RENEWAL (OUTPATIENT)
Age: 60
End: 2020-11-24

## 2020-12-01 ENCOUNTER — APPOINTMENT (OUTPATIENT)
Dept: CARDIOLOGY | Facility: CLINIC | Age: 60
End: 2020-12-01

## 2021-01-01 ENCOUNTER — APPOINTMENT (OUTPATIENT)
Dept: INTERNAL MEDICINE | Facility: CLINIC | Age: 61
End: 2021-01-01
Payer: MEDICARE

## 2021-01-01 ENCOUNTER — APPOINTMENT (OUTPATIENT)
Dept: VASCULAR SURGERY | Facility: CLINIC | Age: 61
End: 2021-01-01
Payer: MEDICARE

## 2021-01-01 ENCOUNTER — APPOINTMENT (OUTPATIENT)
Dept: CARDIOLOGY | Facility: CLINIC | Age: 61
End: 2021-01-01
Payer: MEDICARE

## 2021-01-01 ENCOUNTER — NON-APPOINTMENT (OUTPATIENT)
Age: 61
End: 2021-01-01

## 2021-01-01 ENCOUNTER — RESULT REVIEW (OUTPATIENT)
Age: 61
End: 2021-01-01

## 2021-01-01 ENCOUNTER — RX RENEWAL (OUTPATIENT)
Age: 61
End: 2021-01-01

## 2021-01-01 ENCOUNTER — APPOINTMENT (OUTPATIENT)
Dept: INTERNAL MEDICINE | Facility: CLINIC | Age: 61
End: 2021-01-01

## 2021-01-01 ENCOUNTER — APPOINTMENT (OUTPATIENT)
Dept: VASCULAR SURGERY | Facility: CLINIC | Age: 61
End: 2021-01-01

## 2021-01-01 VITALS
BODY MASS INDEX: 25.9 KG/M2 | WEIGHT: 165 LBS | TEMPERATURE: 96.9 F | HEART RATE: 66 BPM | HEIGHT: 67 IN | DIASTOLIC BLOOD PRESSURE: 80 MMHG | SYSTOLIC BLOOD PRESSURE: 130 MMHG | OXYGEN SATURATION: 98 %

## 2021-01-01 VITALS
RESPIRATION RATE: 12 BRPM | HEIGHT: 67 IN | DIASTOLIC BLOOD PRESSURE: 78 MMHG | OXYGEN SATURATION: 100 % | SYSTOLIC BLOOD PRESSURE: 100 MMHG | WEIGHT: 150 LBS | HEART RATE: 63 BPM | BODY MASS INDEX: 23.54 KG/M2 | TEMPERATURE: 98.6 F

## 2021-01-01 VITALS — HEART RATE: 64 BPM | DIASTOLIC BLOOD PRESSURE: 87 MMHG | SYSTOLIC BLOOD PRESSURE: 153 MMHG

## 2021-01-01 DIAGNOSIS — F32.9 ANXIETY DISORDER, UNSPECIFIED: ICD-10-CM

## 2021-01-01 DIAGNOSIS — Z09 ENCOUNTER FOR FOLLOW-UP EXAMINATION AFTER COMPLETED TREATMENT FOR CONDITIONS OTHER THAN MALIGNANT NEOPLASM: ICD-10-CM

## 2021-01-01 DIAGNOSIS — Z99.2 END STAGE RENAL DISEASE: ICD-10-CM

## 2021-01-01 DIAGNOSIS — I50.22 CHRONIC SYSTOLIC (CONGESTIVE) HEART FAILURE: ICD-10-CM

## 2021-01-01 DIAGNOSIS — F41.9 ANXIETY DISORDER, UNSPECIFIED: ICD-10-CM

## 2021-01-01 DIAGNOSIS — E11.9 TYPE 2 DIABETES MELLITUS W/OUT COMPLICATIONS: ICD-10-CM

## 2021-01-01 DIAGNOSIS — N18.6 END STAGE RENAL DISEASE: ICD-10-CM

## 2021-01-01 DIAGNOSIS — I73.9 PERIPHERAL VASCULAR DISEASE, UNSPECIFIED: ICD-10-CM

## 2021-01-01 DIAGNOSIS — I25.10 ATHEROSCLEROTIC HEART DISEASE OF NATIVE CORONARY ARTERY W/OUT ANGINA PECTORIS: ICD-10-CM

## 2021-01-01 DIAGNOSIS — I10 ESSENTIAL (PRIMARY) HYPERTENSION: ICD-10-CM

## 2021-01-01 PROCEDURE — 99213 OFFICE O/P EST LOW 20 MIN: CPT

## 2021-01-01 PROCEDURE — 99072 ADDL SUPL MATRL&STAF TM PHE: CPT

## 2021-01-01 PROCEDURE — 93000 ELECTROCARDIOGRAM COMPLETE: CPT

## 2021-01-01 PROCEDURE — 99214 OFFICE O/P EST MOD 30 MIN: CPT

## 2021-01-01 PROCEDURE — 93990 DOPPLER FLOW TESTING: CPT

## 2021-01-01 RX ORDER — BLOOD SUGAR DIAGNOSTIC
STRIP MISCELLANEOUS
Qty: 180 | Refills: 3 | Status: ACTIVE | COMMUNITY
Start: 2021-01-01 | End: 1900-01-01

## 2021-01-01 RX ORDER — LIDOCAINE AND PRILOCAINE 25; 25 MG/G; MG/G
2.5-2.5 CREAM TOPICAL
Qty: 1 | Refills: 3 | Status: ACTIVE | COMMUNITY
Start: 2021-01-01 | End: 1900-01-01

## 2021-01-01 RX ORDER — FLUTICASONE PROPIONATE AND SALMETEROL 250; 50 UG/1; UG/1
250-50 POWDER RESPIRATORY (INHALATION)
Qty: 1 | Refills: 2 | Status: ACTIVE | COMMUNITY
Start: 2019-01-03 | End: 1900-01-01

## 2021-01-01 RX ORDER — SACUBITRIL AND VALSARTAN 49; 51 MG/1; MG/1
49-51 TABLET, FILM COATED ORAL TWICE DAILY
Refills: 0 | Status: ACTIVE | COMMUNITY

## 2021-01-01 RX ORDER — ATORVASTATIN CALCIUM 80 MG/1
80 TABLET, FILM COATED ORAL
Qty: 90 | Refills: 0 | Status: ACTIVE | COMMUNITY
Start: 1900-01-01 | End: 1900-01-01

## 2021-01-01 RX ORDER — FLUTICASONE PROPIONATE AND SALMETEROL 250; 50 UG/1; UG/1
250-50 POWDER RESPIRATORY (INHALATION)
Qty: 1 | Refills: 10 | Status: DISCONTINUED | COMMUNITY
Start: 2019-11-21 | End: 2021-01-01

## 2021-01-01 RX ORDER — INSULIN LISPRO 100 [IU]/ML
100 INJECTION, SOLUTION INTRAVENOUS; SUBCUTANEOUS
Qty: 6 | Refills: 0 | Status: DISCONTINUED | COMMUNITY
Start: 2019-02-04 | End: 2021-01-01

## 2021-01-01 RX ORDER — FUROSEMIDE 40 MG/1
40 TABLET ORAL DAILY
Refills: 0 | Status: ACTIVE | COMMUNITY

## 2021-01-01 RX ORDER — FUROSEMIDE 40 MG/1
40 TABLET ORAL
Qty: 90 | Refills: 0 | Status: DISCONTINUED | COMMUNITY
Start: 2019-01-03 | End: 2021-01-01

## 2021-01-01 RX ORDER — CARVEDILOL 12.5 MG/1
12.5 TABLET, FILM COATED ORAL
Qty: 180 | Refills: 0 | Status: ACTIVE | COMMUNITY
Start: 2019-11-21 | End: 1900-01-01

## 2021-01-01 RX ORDER — INSULIN LISPRO 100 [IU]/ML
100 INJECTION, SOLUTION INTRAVENOUS; SUBCUTANEOUS
Qty: 15 | Refills: 10 | Status: ACTIVE | COMMUNITY
Start: 2020-03-20 | End: 1900-01-01

## 2021-01-01 RX ORDER — OXYCODONE AND ACETAMINOPHEN 5; 325 MG/1; MG/1
5-325 TABLET ORAL
Qty: 24 | Refills: 0 | Status: ACTIVE | COMMUNITY
Start: 2021-01-01 | End: 1900-01-01

## 2021-01-01 RX ORDER — GABAPENTIN 100 MG/1
100 CAPSULE ORAL
Qty: 180 | Refills: 2 | Status: DISCONTINUED | COMMUNITY
Start: 2021-01-01 | End: 2021-01-01

## 2021-01-01 RX ORDER — LANCETS 33 GAUGE
EACH MISCELLANEOUS
Qty: 180 | Refills: 3 | Status: ACTIVE | COMMUNITY
Start: 2021-01-01 | End: 1900-01-01

## 2021-01-01 RX ORDER — BLOOD SUGAR DIAGNOSTIC
STRIP MISCELLANEOUS
Qty: 1 | Refills: 9 | Status: ACTIVE | COMMUNITY
Start: 2020-10-02 | End: 1900-01-01

## 2021-01-01 RX ORDER — SEVELAMER CARBONATE 800 MG/1
800 TABLET, FILM COATED ORAL 3 TIMES DAILY
Qty: 810 | Refills: 1 | Status: ACTIVE | COMMUNITY
Start: 2020-05-29 | End: 1900-01-01

## 2021-01-01 RX ORDER — ISOSORBIDE MONONITRATE 60 MG/1
60 TABLET, EXTENDED RELEASE ORAL DAILY
Qty: 90 | Refills: 0 | Status: ACTIVE | COMMUNITY
Start: 1900-01-01 | End: 1900-01-01

## 2021-01-01 RX ORDER — ISOPROPYL ALCOHOL 70 ML/100ML
SWAB TOPICAL
Qty: 2 | Refills: 3 | Status: ACTIVE | COMMUNITY
Start: 2021-01-01 | End: 1900-01-01

## 2021-01-01 RX ORDER — APIXABAN 2.5 MG/1
2.5 TABLET, FILM COATED ORAL
Qty: 180 | Refills: 0 | Status: ACTIVE | COMMUNITY
Start: 2019-12-12 | End: 1900-01-01

## 2021-01-01 RX ORDER — LINAGLIPTIN 5 MG/1
5 TABLET, FILM COATED ORAL DAILY
Qty: 90 | Refills: 0 | Status: ACTIVE | COMMUNITY
Start: 2019-11-21 | End: 1900-01-01

## 2021-01-01 RX ORDER — BLOOD-GLUCOSE METER
W/DEVICE KIT MISCELLANEOUS
Qty: 1 | Refills: 0 | Status: ACTIVE | COMMUNITY
Start: 2021-01-01 | End: 1900-01-01

## 2021-01-01 RX ORDER — CITALOPRAM HYDROBROMIDE 20 MG/1
20 TABLET, FILM COATED ORAL
Qty: 90 | Refills: 2 | Status: ACTIVE | COMMUNITY
Start: 2021-01-01 | End: 1900-01-01

## 2021-01-01 RX ORDER — TRAMADOL HYDROCHLORIDE 50 MG/1
50 TABLET, COATED ORAL
Qty: 15 | Refills: 0 | Status: DISCONTINUED | COMMUNITY
Start: 2020-06-30 | End: 2021-01-01

## 2021-02-09 PROBLEM — I73.9 PERIPHERAL VASCULAR DISEASE: Status: ACTIVE | Noted: 2018-12-14

## 2021-02-09 NOTE — REVIEW OF SYSTEMS
[Shortness Of Breath] : no shortness of breath [Cough] : no cough [Dyspnea on Exertion] : not dyspnea on exertion [Back Pain] : no back pain [Negative] : Neurological [FreeTextEntry9] : transmetatarsal amputation,

## 2021-02-09 NOTE — HISTORY OF PRESENT ILLNESS
[de-identified] : here fro medical f/u , has not been in office since June 2020, doing well, at home c daughter and 3x/week HD

## 2021-02-09 NOTE — PHYSICAL EXAM
[No Edema] : there was no peripheral edema [Grossly Normal Strength/Tone] : grossly normal strength/tone [Normal] : normal gait, coordination grossly intact, no focal deficits and deep tendon reflexes were 2+ and symmetric [de-identified] : R AVF c bruit and thrill [de-identified] : cane

## 2021-06-22 PROBLEM — Z09 HOSPITAL DISCHARGE FOLLOW-UP: Status: RESOLVED | Noted: 2019-01-03 | Resolved: 2021-01-01

## 2021-06-22 PROBLEM — I50.22 CHRONIC SYSTOLIC CONGESTIVE HEART FAILURE: Status: ACTIVE | Noted: 2018-11-14

## 2021-06-22 PROBLEM — I25.10 CAD (CORONARY ARTERY DISEASE): Status: ACTIVE | Noted: 2018-11-14

## 2021-06-22 NOTE — ASSESSMENT
[FreeTextEntry1] : 59 yo male with CAD, prior inferior wall STEMI s/p TNK 2012, and anterior wall STEMI s/p PCI w/ Resolute stent (IRAJ) to LAD in 5/2014 at [facility], chronic systolic heart failure (LVEF 30-35% per 6/3/21 echo, HTN, DM, hyperlipidemia, PAD, and ESRD on HD (MWF). \par \par Patient is clinically stable from cardiac standpoint and appears to be euvolemic at this time. \par Will continue current medical management of patient's chronic systolic heart failure and CAD (aspirin, atorvastatin, carvedilol. Entresto, and Imdur).\par \par Blood pressure is well controlled currently. \par Will continue to monitor on current medical management.

## 2021-06-22 NOTE — PHYSICAL EXAM
[Normal Rate] : normal [Rhythm Regular] : regular [Normal S1] : normal S1 [Normal S2] : normal S2 [S3] : no S3 [S4] : no S4 [No Murmur] : no murmurs heard [No Pitting Edema] : no pitting edema present [Right Carotid Bruit] : no bruit heard over the right carotid [Left Carotid Bruit] : no bruit heard over the left carotid [Normal] : clear lung fields, good air entry, no respiratory distress [No Edema] : no edema [No Cyanosis] : no cyanosis [No Clubbing] : no clubbing [Moves all extremities] : moves all extremities [No Focal Deficits] : no focal deficits [Normal Speech] : normal speech [Alert and Oriented] : alert and oriented [Normal memory] : normal memory

## 2021-06-22 NOTE — HISTORY OF PRESENT ILLNESS
[FreeTextEntry1] : 59 yo male with CAD, prior inferior wall STEMI s/p TNK 2012, and anterior wall STEMI s/p PCI w/ Resolute stent (IRAJ) to LAD in 5/2014 at [facility], chronic systolic heart failure (LVEF 30-35% per 6/3/21 echo), HTN, DM, hyperlipidemia, and ESRD on HD (MWF). He was recently discharged from [place] on 6/5/21 where he presented for CHF exacerbation after missing dialysis. He was started on Entresto during that hospitalization. Patient presents today for cardiac follow-up. Patient denies chest pain, dyspnea, palpitations, syncope, melena, hematochezia, or hematemesis\par \par \par PMD: Deepika Cabrera MD\par Nephrology: Hamlet Wagner MD

## 2021-06-22 NOTE — REASON FOR VISIT
[Hyperlipidemia] : hyperlipidemia [Hypertension] : hypertension [Coronary Artery Disease] : coronary artery disease [Other: ____] : [unfilled]

## 2021-06-22 NOTE — CARDIOLOGY SUMMARY
[de-identified] : \par 10/15/20 ECG: Sinus rhythm, rate 61, 1st degree AV block, poor R wave progression, nonspecific T-wave abnormalities   \par 2/20/19 ECG: Sinus rhythm, rate 76 bpm, anterior infarct, inferior infarct, nonspecific T-wave abnormalities  \par  [de-identified] : \par 1/15/18 Lexiscan Myoview SPECT (at [facility]): No CP or ECG changes with regadenoson stress. Large anteroseptal, anteroapical, apical infarct. Moderate inferior infarct. No ischemia was noted. Severe global hypokinesis, LVEF 27%.\par \par 12/2014 Nuclear stress test: Large infarct of apical, mid to distal anterior, inferior, and mid septal walls, LVEF 33%\par  [de-identified] : \par 6/3/21 Echo (at [facility]): Normal LV size with severe hypokinesis, LVEF 30-35%. Mild LV DD. Mild LVH. Mild MR. Mild AR. Mod TR. Mild ME. Severe pulm HTN with PASP 70 mmHg.\par \par 8/6/19 Echo (at [facility]): Normal LV size. Mild to mod LVH. Akinetic apex. Base contract normally. Rest of LV is severely hypokinetic. LVEF 30-35%. Spontaneous echo contrast in LV. No LV thrombus. Grade II DD. Severely increased LA volume index. PASP 51.6 mmHg. Mild AR/MR/TR. Mild to mod ME. Small pericardial efffusion. Pleural effusion.\par \par 10/30/18 Echo (at [facility]): Normal LV size. Akinetic and aneurysmal apex without obvious thrombus. Remainder of LV is severely hypokinetic. LVEF 25-30%. Severely increased LA volume index (58 ml/m2). Mild AR. Mild to mod MR. Mild TR. Moderate pulm HTN with PASP 58 mmHg. Mildly dilated aortic root. Redundant IAS without shunt.\par \par 1/9/18 Echo (at [facility]): Mildly to mod dilated LV. Mild concentric LVH. Base of the heart contracts, apex is akinetic, rest is severely hypokinetic. LVEF 30%. No obvious apical thrombus. Mildly increased LA volume index (40 ml/m2). Moderate MR. Mild TR. \par \par 12/25/17 Echo (at [facility]): Normal LV size. Severely reduced systolic function. Hypokinesis of apical, apical septal, apical lateral, apical to mid anterior wall, and mid inferolateral wall. LVEF 30%. Trivial MR. Trivial TR. Trivial pericardial effusion.\par \par 12/13/17 Echo (at [facility]): Mildly-moderately dilated LV. Mild LVH. Apical akinesis, severe hypokinesis of mid segments. Basal segments contract normally. LVEF 30%. Echocontrast within LV apex w/o obvious thrombus. Mildly increased LA size. Thickened mitral valve. MAC. Mild to mod MR. AV sclerosis. Trace AR. Mild TR. PASP 34 mmHg. Trace ME.\par \par 5/12/16 Echo: LV dilatation. Septum, apex  and mid posterior wall akinetic on parasternal LAX view. Severe hypo/akinesis of mid anterior - mid anteroseptal - mid septal - mid inferior segments on the short axis view. [place] and septum hypo/akinetic. Apical anterior and apical inferior akinesis on 2 chamber view, and suspected dense thrombus seen here, as well as less echogenic thrombus at true apex. Scallop prolapse of the mitral posterior leaflet.\par \par 6/22/15 Echo (at [facility]: Akinetic apex. LVEF 30-35%. Trivial MR. Trivial ME.\par  [de-identified] : \par 5/21/14 Cardiac cath (at [facility]):\par 100% mid LAD stenosis -> PCI with Resolute stent (IRAJ)\par Non-obstructive LCfx disease\par LVEF 40%\par \par 8/23/12 Cardiac cath (at [facility]):\par 25% mid LAD\par 35% mid RCA\par LVEF 60%

## 2021-08-03 PROBLEM — E11.9 DM2 (DIABETES MELLITUS, TYPE 2): Status: ACTIVE | Noted: 2019-01-03

## 2021-08-10 PROBLEM — F41.9 ANXIETY AND DEPRESSION: Status: ACTIVE | Noted: 2021-01-01

## 2021-10-06 PROBLEM — I10 ESSENTIAL HYPERTENSION: Status: ACTIVE | Noted: 2018-11-14

## 2022-01-05 ENCOUNTER — APPOINTMENT (OUTPATIENT)
Dept: HEMATOLOGY ONCOLOGY | Facility: CLINIC | Age: 62
End: 2022-01-05

## 2022-01-09 PROBLEM — N18.6 ESRD ON HEMODIALYSIS: Status: ACTIVE | Noted: 2019-04-09

## 2022-01-09 NOTE — DATA REVIEWED
[FreeTextEntry1] : HD access u/s - A high grade stenosis approximately 2 cm distal to the anastomosis, 2 aneurysms in the body of the AV fistula, 19 mm and 26 mm, no problems within the aneurysms.

## 2022-01-09 NOTE — ASSESSMENT
[FreeTextEntry1] : 61-year-old male who status post right brachiocephalic AV fistula by Dr. Thornton about 2 years ago, to which she has been undergoing dialysis.  He has 2 small aneurysms of the fistula, with healthy overlying skin, no ulcerations.  There is a palpable thrill.  However he has been told by his dialysis nurse that he needs work on his fistula.\par HD access duplex reveals high-grade stenosis in the proximal aspect of AV fistula couple centimeters distal to the anastomosis.\par We will schedule fistulogram and intervention, discussed this with the patient.

## 2022-01-09 NOTE — HISTORY OF PRESENT ILLNESS
[FreeTextEntry1] : 57 yo male well known to me. He is s/p a fistulagram and balloon angioplasty of the AVF for difficulty accessing the fistula when HD was initiated. He reports that he is undergoing uneventful HD. He reports that his arm swelling is slowly improving. He denies pain in his right arm. [de-identified] : 11/24/21 - He reports that he has been undergoing dialysis via his right arm brachiocephalic fistula.  There are 2 small aneurysms, he reports that he was advised to follow-up with his access surgeon, however he cannot say if there is any problems with dialysis.\par \par 12/16/21 - His HD nurse tells him that he is having inadequate dialysis.  Denies pain in his arm, denies swelling.

## 2022-01-09 NOTE — HISTORY OF PRESENT ILLNESS
[de-identified] : 11/24/21 - He reports that he has been undergoing dialysis via his right arm brachiocephalic fistula.  There are 2 small aneurysms, he reports that he was advised to follow-up with his access surgeon, however he cannot say if there is any problems with dialysis. [FreeTextEntry1] : 57 yo male well known to me. He is s/p a fistulagram and balloon angioplasty of the AVF for difficulty accessing the fistula when HD was initiated. He reports that he is undergoing uneventful HD. He reports that his arm swelling is slowly improving. He denies pain in his right arm.

## 2022-01-09 NOTE — PHYSICAL EXAM
[Normal Breath Sounds] : Normal breath sounds [2+] : right 2+ [Skin Ulcer] : no ulcer [Alert] : alert [Oriented to Person] : oriented to person [Oriented to Place] : oriented to place [Oriented to Time] : oriented to time [Calm] : calm [de-identified] : NAD [de-identified] : NCAT [de-identified] : right arm radiocephalic AV fistula with a palpable thrill, aneurysms, healthy overlying skin with no ulcerations.

## 2022-01-09 NOTE — ASSESSMENT
[FreeTextEntry1] : 61-year-old male who status post right brachiocephalic AV fistula by Dr. Thornton about 2 years ago, to which she has been undergoing dialysis.  He has 2 small aneurysms of the fistula, with healthy overlying skin, no ulcerations.  There is a palpable thrill.  I am unsure if he is having problems with dialysis if there is any other indication for treatment of the fistula.  He does have a past history of outflow stenosis, however there is a thrill currently on exam.\par I will discuss the quality of dialysis with his nephrologist, in addition we will obtain dialysis access duplex.  He will follow up after study performed.

## 2022-01-09 NOTE — PHYSICAL EXAM
[Normal Breath Sounds] : Normal breath sounds [2+] : right 2+ [Skin Ulcer] : no ulcer [Alert] : alert [Oriented to Person] : oriented to person [Oriented to Place] : oriented to place [Oriented to Time] : oriented to time [Calm] : calm [de-identified] : NAD [de-identified] : NCAT [de-identified] : right arm radiocephalic AV fistula with a palpable thrill, aneurysms, healthy overlying skin with no ulcerations.

## 2022-01-09 NOTE — REVIEW OF SYSTEMS
[Fever] : no fever [Chills] : no chills [Feeling Tired] : not feeling tired [Shortness Of Breath] : no shortness of breath

## 2022-05-05 ENCOUNTER — RX RENEWAL (OUTPATIENT)
Age: 62
End: 2022-05-05